# Patient Record
Sex: MALE | Race: ASIAN | NOT HISPANIC OR LATINO | URBAN - METROPOLITAN AREA
[De-identification: names, ages, dates, MRNs, and addresses within clinical notes are randomized per-mention and may not be internally consistent; named-entity substitution may affect disease eponyms.]

---

## 2021-04-09 ENCOUNTER — EMERGENCY (EMERGENCY)
Facility: HOSPITAL | Age: 26
LOS: 1 days | Discharge: HOME | End: 2021-04-09
Attending: EMERGENCY MEDICINE | Admitting: EMERGENCY MEDICINE
Payer: MEDICAID

## 2021-04-09 VITALS
RESPIRATION RATE: 16 BRPM | SYSTOLIC BLOOD PRESSURE: 137 MMHG | TEMPERATURE: 98 F | HEART RATE: 84 BPM | OXYGEN SATURATION: 100 % | DIASTOLIC BLOOD PRESSURE: 86 MMHG

## 2021-04-09 PROCEDURE — 99283 EMERGENCY DEPT VISIT LOW MDM: CPT

## 2021-04-09 NOTE — ED ADULT TRIAGE NOTE - CHIEF COMPLAINT QUOTE
reports injury to nose last week. states ball hit him in the face. c/o pain at nose radiating to teeth. no deformities noted, denies sob

## 2021-04-10 RX ORDER — ACETAMINOPHEN 500 MG
650 TABLET ORAL ONCE
Refills: 0 | Status: DISCONTINUED | OUTPATIENT
Start: 2021-04-10 | End: 2021-04-13

## 2021-04-10 RX ORDER — IBUPROFEN 200 MG
600 TABLET ORAL ONCE
Refills: 0 | Status: DISCONTINUED | OUTPATIENT
Start: 2021-04-10 | End: 2021-04-13

## 2021-04-10 NOTE — ED ADULT NURSE NOTE - OBJECTIVE STATEMENT
26yo male received in intake 10c. pt  A&Ox3, ambulatory, c/o teeth pain x1 week. pt states he was struck in the nose with a baseball last week, had one episode of epistaxis which has  resolved. since then he complained of pain to all his teeth. no apparent trauma noted, no broken teeth, no swelling noted. denies sob. respiration even and non-labored. in nad. MD samson in progress. Medicated as per order.

## 2021-04-10 NOTE — ED PROVIDER NOTE - PATIENT PORTAL LINK FT
You can access the FollowMyHealth Patient Portal offered by [facility] by registering at the following website: [email or web address]. By joining [email or web address]’s FollowMyHealth portal, you will also be able to view your health information using other applications (apps) compatible with our system.

## 2021-04-10 NOTE — ED PROVIDER NOTE - CLINICAL SUMMARY MEDICAL DECISION MAKING FREE TEXT BOX
26 y/o M with teeth pain s/p facial injury.  No e/o trauma fracture or infection.  Will treat pain with nsaids, dc home for supportive care.

## 2021-04-10 NOTE — ED PROVIDER NOTE - ENMT, MLM
Airway patent, Nasal mucosa clear no septal hematoma or active bleeding, no deformity or nasal bridge tenderness. Mouth with normal mucosa. Teeth all intact, no laxity or factured teeth, no malocclusion or trismus.  Throat has no vesicles, no oropharyngeal exudates and uvula is midline.

## 2022-02-26 ENCOUNTER — APPOINTMENT (OUTPATIENT)
Dept: PEDIATRICS | Facility: HOSPITAL | Age: 27
End: 2022-02-26
Payer: MEDICAID

## 2022-02-26 ENCOUNTER — OUTPATIENT (OUTPATIENT)
Dept: OUTPATIENT SERVICES | Age: 27
LOS: 1 days | End: 2022-02-26

## 2022-02-26 DIAGNOSIS — Z23 ENCOUNTER FOR IMMUNIZATION: ICD-10-CM

## 2022-02-26 PROBLEM — Z00.00 ENCOUNTER FOR PREVENTIVE HEALTH EXAMINATION: Status: ACTIVE | Noted: 2022-02-26

## 2022-02-26 PROBLEM — Z78.9 OTHER SPECIFIED HEALTH STATUS: Chronic | Status: ACTIVE | Noted: 2021-04-10

## 2022-02-26 PROCEDURE — 0054A: CPT

## 2022-02-27 PROBLEM — Z23 ENCOUNTER FOR IMMUNIZATION: Status: ACTIVE | Noted: 2022-02-27

## 2022-02-27 NOTE — HISTORY OF PRESENT ILLNESS
[COVID-19] : COVID-19 [FreeTextEntry1] : Patient here for Booster COVID vaccine. \par Administered 0.3ml of Pfizer vaccine in the right deltoid by Dr. Pia Connor.\par Observed for 15 minutes with no adverse reaction.

## 2025-03-28 DIAGNOSIS — Z31.440 ENCOUNTER OF MALE FOR TESTING FOR GENETIC DISEASE CARRIER STATUS FOR PROCREATIVE MANAGEMENT: ICD-10-CM

## 2025-03-30 LAB
HCT VFR BLD CALC: 44.2 %
HGB BLD-MCNC: 15.1 G/DL
MCHC RBC-ENTMCNC: 30.4 PG
MCHC RBC-ENTMCNC: 34.2 G/DL
MCV RBC AUTO: 88.9 FL
PLATELET # BLD AUTO: 235 K/UL
RBC # BLD: 4.97 M/UL
RBC # FLD: 13.3 %
WBC # FLD AUTO: 7.16 K/UL